# Patient Record
Sex: MALE | Race: WHITE | NOT HISPANIC OR LATINO | URBAN - METROPOLITAN AREA
[De-identification: names, ages, dates, MRNs, and addresses within clinical notes are randomized per-mention and may not be internally consistent; named-entity substitution may affect disease eponyms.]

---

## 2020-03-04 ENCOUNTER — AMBULATORY - HEALTHEAST (OUTPATIENT)
Dept: MATERNAL FETAL MEDICINE | Facility: HOSPITAL | Age: 30
End: 2020-03-04

## 2020-03-04 ENCOUNTER — TRANSFERRED RECORDS (OUTPATIENT)
Dept: HEALTH INFORMATION MANAGEMENT | Facility: CLINIC | Age: 30
End: 2020-03-04

## 2020-03-04 ENCOUNTER — AMBULATORY - HEALTHEAST (OUTPATIENT)
Dept: LAB | Facility: HOSPITAL | Age: 30
End: 2020-03-04

## 2020-03-04 DIAGNOSIS — Z13.71 SCREENING FOR GENETIC DISEASE CARRIER STATUS: ICD-10-CM

## 2020-03-09 ENCOUNTER — COMMUNICATION - HEALTHEAST (OUTPATIENT)
Dept: MATERNAL FETAL MEDICINE | Facility: HOSPITAL | Age: 30
End: 2020-03-09

## 2020-03-09 DIAGNOSIS — Z13.71 SCREENING FOR GENETIC DISEASE CARRIER STATUS: ICD-10-CM

## 2020-03-25 ENCOUNTER — COMMUNICATION - HEALTHEAST (OUTPATIENT)
Dept: MATERNAL FETAL MEDICINE | Facility: HOSPITAL | Age: 30
End: 2020-03-25

## 2020-03-25 LAB
MISCELLANEOUS TEST DEPT. - HE HISTORICAL: NORMAL
PERFORMING LAB: NORMAL
SPECIMEN STATUS: NORMAL
TEST NAME: NORMAL

## 2021-06-06 NOTE — TELEPHONE ENCOUNTER
I spoke with [employer] today and there was a mix up in the understanding of the test requisition. For sequencing of the HEXA gene, a purple top tube is needed, which is what was sent. [employer] thought HEXA enzyme analysis was ordered (which needs a yellow top tube).     [facility] still has Octavio's DNA and will begin analysis today. Octavio does NOT need another blood draw so I have cancelled the Genetic counseling order I had previously entered.     Per the request of Holly Genetic Counselor (P: [phone]) at [facility], I have faxed (F: [phone]) an updated requisition specifying sequencing instead of enzyme analysis.    Cheryl Simpson MS, [facility]  Genetic Counselor  Maternal Fetal Medicine  [facility]  Phone: [phone]  Pager: [phone]  Email: [email or web address]

## 2021-06-06 NOTE — TELEPHONE ENCOUNTER
[facility] called stating that the sample that was sent for Octavio's carrier screening was sent in the incorrect tube. It was in a purple tube but [employer] stated it should be in a yellow top ACD tube.     I then called and left a detailed voicemail for Octavio informing him of the situation and that he will need to come back to have his blood redrawn for testing. I apologized for the inconvenience and told him a  will be calling him to set up a time for him to get the correct tube from a genetic counselor and then go down to the lab.     I have placed an order for a genetic counselor appt to facilitate this process.    Cheryl Simpson MS, [facility]  Genetic Counselor  Maternal Fetal Medicine  [facility]  Phone: [phone]  Pager: [phone]  Email: [email or web address]

## 2021-06-06 NOTE — PROGRESS NOTES
Octavio was seen in conjunction with his partner, Rain, today at her prenatal appointment at [facility]. Octavio consented to pursuing the HEXA Carrier Screening through [employer]. He will be informed of these results when they become available in 2-3 weeks.     He requested that I leave a detailed voicemail with the result if he does not answer the phone. Octavio signed a consent form allowing me to communicate his results to his partner, Rain, after I call him.      Cheryl Simpson MS, [facility]  Genetic Counselor  Maternal Fetal Medicine  [facility]  Phone: [phone]  Pager: [phone]  Email: [email or web address]

## 2021-06-07 NOTE — TELEPHONE ENCOUNTER
I attempted to leave a detailed voicemail message for Octavio, per his request. Unfortunately, his voicemail box was full.    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 This significantly reduces his risk to have a child with Maximus-Sachs.     Results will be available in his EPIC chart for his partner's primary OB to review.      Cheryl Simpson MS, [facility]  Genetic Counselor  Maternal Fetal Medicine  [facility]  Phone: [phone]  Pager: [phone]  Email: [email or web address]

## 2021-06-07 NOTE — TELEPHONE ENCOUNTER
I called and spoke with Octavio regarding his HEXA carrier screening results. He stated that his partner, Rain, had received the voicemail I left her and informed him that they were negative or normal.     Octavio expressed understanding of the results as described below and had no further questions.    Cheryl Simpson MS, [facility]  Genetic Counselor  Maternal Fetal Medicine  [facility]  Phone: [phone]  Pager: [phone]  Email: [email or web address]

## 2021-07-03 NOTE — ADDENDUM NOTE
Addendum Note by Yasmin Robin, Genetic Counselor at 3/11/2020 11:46 AM     Author: Yasmin Robin Genetic Counselor Service: -- Author Type: Genetic Counselor    Filed: 3/11/2020 11:46 AM Encounter Date: 3/9/2020 Status: Signed    : Yasmin Robin Genetic Counselor (Genetic Counselor)    Addended by: YASMIN ROBIN on: 3/11/2020 11:46 AM        Modules accepted: Orders